# Patient Record
Sex: MALE | Race: AMERICAN INDIAN OR ALASKA NATIVE
[De-identification: names, ages, dates, MRNs, and addresses within clinical notes are randomized per-mention and may not be internally consistent; named-entity substitution may affect disease eponyms.]

---

## 2019-01-08 ENCOUNTER — HOSPITAL ENCOUNTER (EMERGENCY)
Dept: HOSPITAL 31 - C.ER | Age: 64
Discharge: HOME | End: 2019-01-08
Payer: COMMERCIAL

## 2019-01-08 VITALS
HEART RATE: 67 BPM | TEMPERATURE: 98.4 F | OXYGEN SATURATION: 99 % | DIASTOLIC BLOOD PRESSURE: 79 MMHG | SYSTOLIC BLOOD PRESSURE: 136 MMHG | RESPIRATION RATE: 18 BRPM

## 2019-01-08 DIAGNOSIS — R51: Primary | ICD-10-CM

## 2019-01-08 LAB
ALBUMIN SERPL-MCNC: 4.7 G/DL (ref 3.5–5)
ALBUMIN/GLOB SERPL: 1.3 {RATIO} (ref 1–2.1)
ALT SERPL-CCNC: 25 U/L (ref 21–72)
AMYLASE SERPL-CCNC: 134 U/L (ref 30–110)
APTT BLD: 31 SECONDS (ref 21–34)
AST SERPL-CCNC: 20 U/L (ref 17–59)
BACTERIA #/AREA URNS HPF: (no result) /[HPF]
BASOPHILS # BLD AUTO: 0 K/UL (ref 0–0.2)
BASOPHILS NFR BLD: 0.9 % (ref 0–2)
BILIRUB UR-MCNC: NEGATIVE MG/DL
BUN SERPL-MCNC: 17 MG/DL (ref 9–20)
CALCIUM SERPL-MCNC: 9.6 MG/DL (ref 8.6–10.4)
CK MB SERPL-MCNC: < 0.22 NG/ML (ref 0–3.38)
EOSINOPHIL # BLD AUTO: 0 K/UL (ref 0–0.7)
EOSINOPHIL NFR BLD: 0.3 % (ref 0–4)
ERYTHROCYTE [DISTWIDTH] IN BLOOD BY AUTOMATED COUNT: 13.3 % (ref 11.5–14.5)
GFR NON-AFRICAN AMERICAN: 56
GLUCOSE UR STRIP-MCNC: NORMAL MG/DL
HGB BLD-MCNC: 12.4 G/DL (ref 12–18)
INR PPP: 1.3
LEUKOCYTE ESTERASE UR-ACNC: (no result) LEU/UL
LIPASE: 224 U/L (ref 23–300)
LYMPHOCYTES # BLD AUTO: 1.2 K/UL (ref 1–4.3)
LYMPHOCYTES NFR BLD AUTO: 27.8 % (ref 20–40)
MCH RBC QN AUTO: 30.3 PG (ref 27–31)
MCHC RBC AUTO-ENTMCNC: 34.5 G/DL (ref 33–37)
MCV RBC AUTO: 87.9 FL (ref 80–94)
MONOCYTES # BLD: 0.4 K/UL (ref 0–0.8)
MONOCYTES NFR BLD: 8.7 % (ref 0–10)
NEUTROPHILS # BLD: 2.6 K/UL (ref 1.8–7)
NEUTROPHILS NFR BLD AUTO: 62.3 % (ref 50–75)
NRBC BLD AUTO-RTO: 0.1 % (ref 0–2)
PH UR STRIP: 5 [PH] (ref 5–8)
PLATELET # BLD: 211 K/UL (ref 130–400)
PMV BLD AUTO: 7.8 FL (ref 7.2–11.7)
PROT UR STRIP-MCNC: (no result) MG/DL
PROTHROMBIN TIME: 14.3 SECONDS (ref 9.7–12.2)
RBC # BLD AUTO: 4.09 MIL/UL (ref 4.4–5.9)
RBC # UR STRIP: NEGATIVE /UL
SP GR UR STRIP: 1.03 (ref 1–1.03)
SQUAMOUS EPITHIAL: 2 /HPF (ref 0–5)
UROBILINOGEN UR-MCNC: 4 MG/DL (ref 0.2–1)
WBC # BLD AUTO: 4.2 K/UL (ref 4.8–10.8)

## 2019-01-08 PROCEDURE — 80053 COMPREHEN METABOLIC PANEL: CPT

## 2019-01-08 PROCEDURE — 80324 DRUG SCREEN AMPHETAMINES 1/2: CPT

## 2019-01-08 PROCEDURE — 96360 HYDRATION IV INFUSION INIT: CPT

## 2019-01-08 PROCEDURE — 81001 URINALYSIS AUTO W/SCOPE: CPT

## 2019-01-08 PROCEDURE — 93005 ELECTROCARDIOGRAM TRACING: CPT

## 2019-01-08 PROCEDURE — 99285 EMERGENCY DEPT VISIT HI MDM: CPT

## 2019-01-08 PROCEDURE — 87804 INFLUENZA ASSAY W/OPTIC: CPT

## 2019-01-08 PROCEDURE — 80353 DRUG SCREENING COCAINE: CPT

## 2019-01-08 PROCEDURE — 85610 PROTHROMBIN TIME: CPT

## 2019-01-08 PROCEDURE — 80349 CANNABINOIDS NATURAL: CPT

## 2019-01-08 PROCEDURE — 80345 DRUG SCREENING BARBITURATES: CPT

## 2019-01-08 PROCEDURE — 80358 DRUG SCREENING METHADONE: CPT

## 2019-01-08 PROCEDURE — 71045 X-RAY EXAM CHEST 1 VIEW: CPT

## 2019-01-08 PROCEDURE — 80361 OPIATES 1 OR MORE: CPT

## 2019-01-08 PROCEDURE — 70450 CT HEAD/BRAIN W/O DYE: CPT

## 2019-01-08 PROCEDURE — 85730 THROMBOPLASTIN TIME PARTIAL: CPT

## 2019-01-08 PROCEDURE — 80346 BENZODIAZEPINES1-12: CPT

## 2019-01-08 PROCEDURE — 83690 ASSAY OF LIPASE: CPT

## 2019-01-08 PROCEDURE — 82550 ASSAY OF CK (CPK): CPT

## 2019-01-08 PROCEDURE — 80320 DRUG SCREEN QUANTALCOHOLS: CPT

## 2019-01-08 PROCEDURE — 83992 ASSAY FOR PHENCYCLIDINE: CPT

## 2019-01-08 PROCEDURE — 82150 ASSAY OF AMYLASE: CPT

## 2019-01-08 PROCEDURE — 83735 ASSAY OF MAGNESIUM: CPT

## 2019-01-08 PROCEDURE — 82553 CREATINE MB FRACTION: CPT

## 2019-01-08 PROCEDURE — 85025 COMPLETE CBC W/AUTO DIFF WBC: CPT

## 2019-01-08 PROCEDURE — 84100 ASSAY OF PHOSPHORUS: CPT

## 2019-01-08 PROCEDURE — 84484 ASSAY OF TROPONIN QUANT: CPT

## 2019-01-08 NOTE — CT
Date of service: 01/08/2019



PROCEDURE:  CT HEAD WITHOUT CONTRAST.



HISTORY:

headache



COMPARISON:

None available.



TECHNIQUE:

Axial computed tomography images were obtained through the head/brain 

without intravenous contrast.  



Radiation dose:



Total exam DLP = 1104.49 mGy-cm.



This CT exam was performed using one or more of the following dose 

reduction techniques: Automated exposure control, adjustment of the 

mA and/or kV according to patient size, and/or use of iterative 

reconstruction technique.



FINDINGS:



HEMORRHAGE:

No intracranial hemorrhage. 



BRAIN:

No mass effect or edema.  Mild scattered white matter hypodensities, 

which are nonspecific, but often seen with chronic microvascular 

ischemic disease. Please note that MRI with diffusion imaging is more 

sensitive in the detection of acute ischemic event.



VENTRICLES:

No hydrocephalus. 



CALVARIUM:

Unremarkable.



PARANASAL SINUSES:

Mucosal thickening/opacification of the left maxillary sinus.  

Mucosal thickening of the right maxillary sinus, ethmoid air cells, 

frontal sinuses, and sphenoid sinuses.



MASTOID AIR CELLS:

Unremarkable as visualized. No inflammatory changes.



OTHER FINDINGS:

None.



IMPRESSION:

No acute intracranial pathology identified. 



Mucosal thickening/opacification of the sinuses as above; correlate 

for sinusitis.

## 2019-01-08 NOTE — C.PDOC
History Of Present Illness


63 year old male presents to the ED for evaluation. Patient states he has been 

feeling unwell, experiencing generalized weakness and headache since yesterday. 

Patient states his neighbor has been "scanning" him. Patient denies any past 

medical history, but also states that he does not regularly follow-up with a 

doctor. Patient was last evaluated by a doctor at a different hospital around 2-

3 weeks ago for similar complaint, but cannot recall the name of the facility. 

Patient denies fever, chills. 


Chief Complaint (Nursing): Weakness/Neurological Deficit


History Per: Patient


History/Exam Limitations: no limitations


Onset/Duration Of Symptoms: Hrs


Current Symptoms Are (Timing): Still Present


Additional History Per: Patient





Past Medical History


Reviewed: Historical Data, Nursing Documentation, Vital Signs


Vital Signs: 





                                Last Vital Signs











Temp  98.4 F   01/08/19 13:17


 


Pulse  67   01/08/19 13:17


 


Resp  18   01/08/19 13:17


 


BP  136/79   01/08/19 13:17


 


Pulse Ox  99   01/08/19 13:17














- Medical History


PMH: No Chronic Diseases


Surgical History: No Surg Hx


Family History: States: Unknown Family Hx





- Social History


Hx Alcohol Use: Yes


Hx Substance Use: No





Review Of Systems


Constitutional: Positive for: Weakness.  Negative for: Fever, Chills


Neurological: Positive for: Headache





Physical Exam





- Physical Exam


Appears: Non-toxic, No Acute Distress


Skin: Normal Color, Warm, Dry


Head: Atraumatic, Normacephalic


Eye(s): bilateral: Normal Inspection


Oral Mucosa: Moist


Neck: Normal ROM, Supple


Chest: Symmetrical, No Deformity, No Tenderness


Cardiovascular: Rhythm Regular, No Murmur


Respiratory: Normal Breath Sounds, No Rales, No Rhonchi, No Wheezing


Extremity: Normal ROM, Capillary Refill (less than 2 seconds )


Neurological/Psych: Oriented x3, Normal Speech, Normal Cognition





ED Course And Treatment





- Laboratory Results


Result Diagrams: 


                                 01/08/19 12:22





                                 01/08/19 12:22


Lab Results: 





                                        











PT  14.3 SECONDS (9.7-12.2)  H  01/08/19  12:22    


 


INR  1.3   01/08/19  12:22    


 


APTT  31 SECONDS (21-34)   01/08/19  12:22    








                                        











Troponin I  < 0.0120 ng/mL (0.00-0.120)   01/08/19  12:22    








                                        











Total Bilirubin  1.2 mg/dL (0.2-1.3)   01/08/19  12:22    


 


AST  20 U/L (17-59)   01/08/19  12:22    


 


ALT  25 U/L (21-72)   01/08/19  12:22    


 


Alkaline Phosphatase  64 U/L ()   01/08/19  12:22    


 


Total Protein  8.3 g/dL (6.3-8.3)   01/08/19  12:22    


 


Albumin  4.7 g/dL (3.5-5.0)   01/08/19  12:22    


 


Globulin  3.5 gm/dL (2.2-3.9)   01/08/19  12:22    


 


Albumin/Globulin Ratio  1.3  (1.0-2.1)   01/08/19  12:22    








                                        











Amylase  134 U/L ()  H  01/08/19  12:22    


 


Lipase  224 U/L ()   01/08/19  12:22    








                                        











Urine Color  Zeynep  (YELLOW)   01/08/19  13:07    


 


Urine Clarity  Clear  (Clear)   01/08/19  13:07    


 


Urine pH  5.0  (5.0-8.0)   01/08/19  13:07    


 


Ur Specific Gravity  1.031  (1.003-1.030)  H  01/08/19  13:07    


 


Urine Protein  1+ mg/dL (NEGATIVE)  H  01/08/19  13:07    


 


Urine Glucose (UA)  Normal mg/dL (Normal)   01/08/19  13:07    


 


Urine Ketones  Trace mg/dL (NEGATIVE)   01/08/19  13:07    


 


Urine Blood  Negative  (NEGATIVE)   01/08/19  13:07    


 


Urine Nitrate  Negative  (NEGATIVE)   01/08/19  13:07    


 


Urine Bilirubin  Negative  (NEGATIVE)   01/08/19  13:07    


 


Urine Urobilinogen  4.0 mg/dL (0.2-1.0)   01/08/19  13:07    


 


Ur Leukocyte Esterase  Neg [place]/uL (Negative)   01/08/19  13:07    


 


Urine WBC (Auto)  7 /hpf (0-5)  H  01/08/19  13:07    


 


Urine RBC (Auto)  2 /hpf (0-3)   01/08/19  13:07    


 


Ur Squamous Epith Cells  2 /hpf (0-5)   01/08/19  13:07    


 


Urine Bacteria  Rare  (<OCC)   01/08/19  13:07    











O2 Sat by Pulse Oximetry: 99 (on RA)


Pulse Ox Interpretation: Normal





- Other Rad


  ** CXR


X-Ray: Viewed By Me, Read By Radiologist


Interpretation: Chest x-ray single frontal view.  History: Weakness.  

Comparison: None available.  Findings:  Mild to moderate venous congestion.  

Patchy increased markings at the left lung base.  Atherosclerotic calcification 

at the aortic knob.  Cardiomegaly.  Degenerative changes the spine and 

shoulders.  Impression:  Mild to moderate venous congestion.  Patchy increased 

markings at the left lung base.  Atherosclerotic calcification at the aortic 

knob.  Cardiomegaly.





- CT Scan/US


  ** CT Head


Other Rad Studies (CT/US): Read By Radiologist, Radiology Report Reviewed


CT/US Interpretation: Date of service: 01/08/2019.  PROCEDURE:  CT HEAD WITHOUT 

CONTRAST.  HISTORY:  headache.  COMPARISON:  None available.  TECHNIQUE:  Axial 

computed tomography images were obtained through the head/brain without int

ravenous contrast.  Radiation dose:  Total exam DLP = 1104.49 mGy-cm.  This CT 

exam was performed using one or more of the following dose reduction techniques:

Automated exposure control, adjustment of the mA and/or kV according to patient 

size, and/or use of iterative reconstruction technique.  FINDINGS:  HEMORRHAGE: 

No intracranial hemorrhage.  BRAIN:  No mass effect or edema.  Mild scattered w

honey matter hypodensities, which are nonspecific, but often seen with chronic 

microvascular ischemic disease. Please note that MRI with diffusion imaging is 

more sensitive in the detection of acute ischemic event.  VENTRICLES:  No 

hydrocephalus.  CALVARIUM:  Unremarkable.  PARANASAL SINUSES:  Mucosal 

thickening/opacification of the left maxillary sinus.  Mucosal thickening of the

right maxillary sinus, ethmoid air cells, frontal sinuses, and sphenoid sinuses.

 MASTOID AIR CELLS:  Unremarkable as visualized. No inflammatory changes.  OTHER

FINDINGS:  None.  IMPRESSION:  No acute intracranial pathology identified.  

Mucosal thickening/opacification of the sinuses as above; correlate for sinusit

is.


Progress Note: Bloodwork, urinalysis, CT Head, CXR, EKG, Flu swab ordered and 

reviewed.  Patient is negative for Flu A/B.  IV Fluids given.  Patient will be 

evaluated by crisis worker.





Disposition





- Disposition


Referrals: 


 at [facility] [Outside]


 Service [Outside]


Disposition: HOME/ ROUTINE


Disposition Time: 14:27


Condition: STABLE


Additional Instructions: 


Follow up with Clinic/PMD within 1-2 days. Return to ED if feel worse.


Instructions:  Headache, Adult


Forms:  [employer] [facility] (English)





- Clinical Impression


Clinical Impression: 


 Headache








- PA / NP / Resident Statement


MD/DO has reviewed & agrees with the documentation as recorded.





- Scribe Statement


The provider has reviewed the documentation as recorded by the Scribe (Alaina Wood)








All medical record entries made by the Scribe were at my direction and perso

britany dictated by me. I have reviewed the chart and agree that the record 

accurately reflects my personal performance of the history, physical exam, 

medical decision making, and the department course for this patient. I have also

 personally directed, reviewed, and agree with the discharge instructions and 

disposition.

## 2019-01-08 NOTE — RAD
Chest x-ray single frontal view 



History: Weakness. 



Comparison: None available.



Findings:



Mild to moderate venous congestion.



Patchy increased markings at the left lung base.  Atherosclerotic 

calcification at the aortic knob.



Cardiomegaly.



Degenerative changes the spine and shoulders.



Impression:



Mild to moderate venous congestion.



Patchy increased markings at the left lung base.  



Atherosclerotic calcification at the aortic knob.



Cardiomegaly.